# Patient Record
Sex: FEMALE | Race: WHITE | Employment: UNEMPLOYED | URBAN - NONMETROPOLITAN AREA
[De-identification: names, ages, dates, MRNs, and addresses within clinical notes are randomized per-mention and may not be internally consistent; named-entity substitution may affect disease eponyms.]

---

## 2023-06-15 PROBLEM — E66.811 CLASS 1 OBESITY DUE TO EXCESS CALORIES WITH SERIOUS COMORBIDITY AND BODY MASS INDEX (BMI) OF 34.0 TO 34.9 IN ADULT: Status: ACTIVE | Noted: 2023-06-15

## 2024-09-02 ENCOUNTER — HOSPITAL ENCOUNTER (EMERGENCY)
Age: 49
Discharge: HOME | End: 2024-09-02
Attending: STUDENT IN AN ORGANIZED HEALTH CARE EDUCATION/TRAINING PROGRAM
Payer: COMMERCIAL

## 2024-09-02 VITALS
HEART RATE: 79 BPM | RESPIRATION RATE: 18 BRPM | WEIGHT: 170 LBS | SYSTOLIC BLOOD PRESSURE: 111 MMHG | DIASTOLIC BLOOD PRESSURE: 62 MMHG | TEMPERATURE: 98.1 F | OXYGEN SATURATION: 98 % | HEIGHT: 59 IN | BODY MASS INDEX: 34.27 KG/M2

## 2024-09-02 DIAGNOSIS — T63.441A BEE STING REACTION, ACCIDENTAL OR UNINTENTIONAL, INITIAL ENCOUNTER: Primary | ICD-10-CM

## 2024-09-02 DIAGNOSIS — R22.0 FACIAL SWELLING: ICD-10-CM

## 2024-09-02 PROCEDURE — 99283 EMERGENCY DEPT VISIT LOW MDM: CPT

## 2024-09-02 PROCEDURE — 6360000002 HC RX W HCPCS: Performed by: STUDENT IN AN ORGANIZED HEALTH CARE EDUCATION/TRAINING PROGRAM

## 2024-09-02 RX ADMIN — Medication 10 MG: at 09:07

## 2024-09-02 ASSESSMENT — PAIN - FUNCTIONAL ASSESSMENT: PAIN_FUNCTIONAL_ASSESSMENT: 0-10

## 2024-09-02 ASSESSMENT — LIFESTYLE VARIABLES
HOW MANY STANDARD DRINKS CONTAINING ALCOHOL DO YOU HAVE ON A TYPICAL DAY: PATIENT DOES NOT DRINK
HOW OFTEN DO YOU HAVE A DRINK CONTAINING ALCOHOL: NEVER

## 2024-09-02 ASSESSMENT — PAIN SCALES - GENERAL: PAINLEVEL_OUTOF10: 5

## 2024-09-02 ASSESSMENT — PAIN DESCRIPTION - DESCRIPTORS: DESCRIPTORS: ACHING

## 2024-09-02 ASSESSMENT — PAIN DESCRIPTION - ORIENTATION: ORIENTATION: LEFT

## 2024-09-02 ASSESSMENT — PAIN DESCRIPTION - LOCATION: LOCATION: EYE

## 2024-09-02 ASSESSMENT — PAIN DESCRIPTION - PAIN TYPE: TYPE: ACUTE PAIN

## 2024-09-02 NOTE — ED PROVIDER NOTES
Emergency Department Encounter    Patient: Shayy Newsome  MRN: [id number]  : 1975  Date of Evaluation: 2024  ED Provider:  Marvin Hoff DO    Triage Chief Complaint:   Insect Bite (To left eye by a bee yesterday. Eye is swollen )    [place]:  Shayy Newsome is a 48 y.o. female that presents with 1 day of swelling to her left face in the periorbital region.  Reports that yesterday she was stung by bee on her cheek.  Had some swelling throughout the day yesterday but this morning it was worse and involved her eyelid.  Denies any pain in the actual eye just the surrounding structures.  When her eyes opened up she has grossly intact vision.  No known anaphylactic reactions.  Never has been stung by bee before.  Denies any swelling in her mouth throat, no difficulty breathing wheezing or swallowing.    MDM:    History from : Patient    On arrival patient well-appearing normal vital signs no acute distress.  She has isolated swelling involving the left upper cheek and periorbital region.  There is no significant erythema concerning for secondary infection.  Based on her symptoms and exam lower sufficient for anaphylactic reaction.  Likely local inflammatory reaction from the sting itself.  No stinger that I can identify that can be removed.  She has been trialing Benadryl and ice with some improvement at home.  Recommended continue doing these therapies.  Additionally will trial steroids today.  I did note that she has had adverse reactions of prednisone and tells me that it has caused behavioral changes in her.  Will trial one-time dose of dexamethasone here and have her monitor at home.  I do not see any signs of preseptal or orbital cellulitis at this time.  I do not think antibiotics are indicated.  I did discuss with her signs and to look out for developing secondary infection.  Exam of her actual eye is reassuring she has equal pupils no chemosis or injection and no evidence of extraocular motion

## 2024-09-02 NOTE — DISCHARGE INSTR - COC
Continuity of Care Form    Patient Name: Shayy Newsome   :  1975  MRN:  [id number]    Admit date:  2024  Discharge date:  ***    Code Status Order: Prior   Advance Directives:   Advance Care Flowsheet Documentation             Admitting Physician:  No admitting provider for patient encounter.  PCP: Bisi Hernández MD    Discharging Nurse: ***  Discharging Hospital Unit/Room#:   Discharging Unit Phone Number: ***    Emergency Contact:   Extended Emergency Contact Information  Primary Emergency Contact: ariana tidwell  Address: [address] [place] [address]  Home Phone: [phone]  Relation: Domestic Partner    Past Surgical History:  Past Surgical History:   Procedure Laterality Date    APPENDECTOMY      CARPAL TUNNEL RELEASE Bilateral 2021    CHOLECYSTECTOMY      HYSTERECTOMY (CERVIX STATUS UNKNOWN)      IR BIOPSY LIVER PERCUTANEOUS  4/3/2023    IR BIOPSY LIVER PERCUTANEOUS 4/3/2023 Doug Malone DO [facility]    KNEE SURGERY Right     NECK SURGERY      Tumor removed from neck    PELVIC LAPAROSCOPY      TOE SURGERY      TUBAL LIGATION         Immunization History:   Immunization History   Administered Date(s) Administered    COVID-19, PFIZER Bivalent, DO NOT Dilute, (age 12y+), IM, 30 mcg/0.3 mL 2022    COVID-19, PFIZER PURPLE top, DILUTE for use, (age 12 y+), 30mcg/0.3mL 2021, 2021, 2022    COVID-19, PFIZER, (- formula), (age 12y+), IM, 30mcg/0.3mL 2023    Influenza, FLUCELVAX, (age 6 mo+), MDCK, Quadv PF, 0.5mL 2021, 2022    TDaP, ADACEL (age 10y-64y), BOOSTRIX (age 10y+), IM, 0.5mL 2022       Active Problems:  Patient Active Problem List   Diagnosis Code    DVT of deep femoral vein, left (HCC) I82.412    DVT, lower extremity, distal, acute, bilateral (HCC) I82.4Z3    H/O deep venous thrombosis Z86.718    Dyslipidemia E78.5    Class 1 obesity due to excess calories with serious

## 2024-09-03 ENCOUNTER — TELEPHONE (OUTPATIENT)
Dept: CARDIOLOGY CLINIC | Age: 49
End: 2024-09-03

## 2024-09-03 NOTE — TELEPHONE ENCOUNTER
Patient called she has a knot on her left leg above her   Ankle that is purple / warm to the touch , she has a hx  Of a blood clot in that leg please advise,

## 2024-09-04 ENCOUNTER — APPOINTMENT (OUTPATIENT)
Dept: ULTRASOUND IMAGING | Age: 49
End: 2024-09-04
Payer: COMMERCIAL

## 2024-09-04 ENCOUNTER — HOSPITAL ENCOUNTER (EMERGENCY)
Age: 49
Discharge: HOME | End: 2024-09-04
Attending: EMERGENCY MEDICINE
Payer: COMMERCIAL

## 2024-09-04 VITALS
HEART RATE: 80 BPM | RESPIRATION RATE: 16 BRPM | DIASTOLIC BLOOD PRESSURE: 78 MMHG | TEMPERATURE: 97.8 F | HEIGHT: 59 IN | WEIGHT: 177.6 LBS | SYSTOLIC BLOOD PRESSURE: 120 MMHG | OXYGEN SATURATION: 100 % | BODY MASS INDEX: 35.8 KG/M2

## 2024-09-04 DIAGNOSIS — M25.552 PAIN IN JOINT INVOLVING LEFT PELVIC REGION AND THIGH: Primary | ICD-10-CM

## 2024-09-04 PROCEDURE — 99284 EMERGENCY DEPT VISIT MOD MDM: CPT

## 2024-09-04 PROCEDURE — 93971 EXTREMITY STUDY: CPT

## 2024-09-04 ASSESSMENT — PAIN - FUNCTIONAL ASSESSMENT: PAIN_FUNCTIONAL_ASSESSMENT: NONE - DENIES PAIN

## 2024-09-04 NOTE — TELEPHONE ENCOUNTER
Shayy called the office back, states her leg is swollen,red, and warm.  She is wanting to go to the ED to get checked out since she has a history of blood clots.

## 2024-09-04 NOTE — ED PROVIDER NOTES
Triage Chief Complaint:   Leg Swelling (Has a spot to Lt lower leg that is discolored and she thought it was a bruise.Started about a month ago. Some swelling in that area and felt like it was warm to the touch. Does have history of blood clot in the past.)    [place]:  Shayy Newsome is a 48 y.o. female that presents to the ED after being evaluated by nurse.  Patient states she has had a DVT before and reviewed the records + October 2022.  This occurred post COVID.  Patient to ED 40 hours ago for bee sting to her face no current complications.  Patient has no posterior calf pain.  She states family member is a nurse also in another state and recommended that she come in to be seen.  No other high risk features no red flags no injury no puncture wounds        Past Medical History:   Diagnosis Date    Anxiety     Bipolar 1 disorder (HCC)     Depression     MARITZA (generalized anxiety disorder)     GERD (gastroesophageal reflux disease)     Hypercholesteremia     Hyperinsulinemia     Hyperlipidemia     Migraine     Psychogenic nonepileptic seizure     PTSD (post-traumatic stress disorder)     RLS (restless legs syndrome)     Vitamin D deficiency      Past Surgical History:   Procedure Laterality Date    APPENDECTOMY      CARPAL TUNNEL RELEASE Bilateral 09/2021    CHOLECYSTECTOMY      HYSTERECTOMY (CERVIX STATUS UNKNOWN)      IR BIOPSY LIVER PERCUTANEOUS  4/3/2023    IR BIOPSY LIVER PERCUTANEOUS 4/3/2023 Doug Malone DO [facility]    KNEE SURGERY Right     NECK SURGERY      Tumor removed from neck    PELVIC LAPAROSCOPY      TOE SURGERY      TUBAL LIGATION       Family History   Problem Relation Age of Onset    Diabetes Mother     Cancer Father         lung    High Blood Pressure Father     High Cholesterol Father     Hypertension Father     Heart Attack Father     Heart Disease Father     Stroke Father      Social History     Socioeconomic History    Marital status:      Spouse name: Not on file

## 2025-05-06 ENCOUNTER — APPOINTMENT (OUTPATIENT)
Dept: CT IMAGING | Age: 50
End: 2025-05-06
Payer: COMMERCIAL

## 2025-05-06 ENCOUNTER — HOSPITAL ENCOUNTER (OUTPATIENT)
Age: 50
Setting detail: OBSERVATION
Discharge: HOME | End: 2025-05-07
Attending: STUDENT IN AN ORGANIZED HEALTH CARE EDUCATION/TRAINING PROGRAM | Admitting: INTERNAL MEDICINE
Payer: COMMERCIAL

## 2025-05-06 ENCOUNTER — HOSPITAL ENCOUNTER (EMERGENCY)
Age: 50
Discharge: ANOTHER HOSPITAL | End: 2025-05-06
Attending: EMERGENCY MEDICINE
Payer: COMMERCIAL

## 2025-05-06 ENCOUNTER — APPOINTMENT (OUTPATIENT)
Dept: GENERAL RADIOLOGY | Age: 50
End: 2025-05-06
Payer: COMMERCIAL

## 2025-05-06 VITALS
WEIGHT: 193.4 LBS | RESPIRATION RATE: 23 BRPM | TEMPERATURE: 98.4 F | OXYGEN SATURATION: 99 % | BODY MASS INDEX: 38.99 KG/M2 | HEART RATE: 70 BPM | SYSTOLIC BLOOD PRESSURE: 126 MMHG | HEIGHT: 59 IN | DIASTOLIC BLOOD PRESSURE: 72 MMHG

## 2025-05-06 DIAGNOSIS — R20.2 PARESTHESIAS: Primary | ICD-10-CM

## 2025-05-06 DIAGNOSIS — R29.90 STROKE-LIKE SYMPTOMS: Primary | ICD-10-CM

## 2025-05-06 LAB
ALBUMIN SERPL-MCNC: 4.1 G/DL (ref 3.4–5)
ALBUMIN/GLOB SERPL: 1.4 {RATIO}
ALP SERPL-CCNC: 62 U/L (ref 40–129)
ALT SERPL-CCNC: 53 U/L (ref 10–40)
ANION GAP SERPL CALCULATED.3IONS-SCNC: 12 MMOL/L (ref 9–17)
AST SERPL-CCNC: 36 U/L (ref 15–37)
BASOPHILS # BLD: 0.01 K/UL
BASOPHILS NFR BLD: 0 % (ref 0–1)
BILIRUB SERPL-MCNC: 0.2 MG/DL (ref 0–1)
BUN SERPL-MCNC: 8 MG/DL (ref 7–20)
CALCIUM SERPL-MCNC: 9 MG/DL (ref 8.3–10.6)
CHLORIDE SERPL-SCNC: 103 MMOL/L (ref 99–110)
CO2 SERPL-SCNC: 24 MMOL/L (ref 21–32)
CREAT SERPL-MCNC: 0.6 MG/DL (ref 0.6–1.1)
EOSINOPHIL # BLD: 0.01 K/UL
EOSINOPHILS RELATIVE PERCENT: 0 % (ref 0–3)
ERYTHROCYTE [DISTWIDTH] IN BLOOD BY AUTOMATED COUNT: 13.4 % (ref 11.7–14.9)
GFR, ESTIMATED: >90 ML/MIN/1.73M2
GLUCOSE BLD-MCNC: 105 MG/DL (ref 74–99)
GLUCOSE SERPL-MCNC: 92 MG/DL (ref 74–99)
HCT VFR BLD AUTO: 39.1 % (ref 37–47)
HGB BLD-MCNC: 13.2 G/DL (ref 12.5–16)
IMM GRANULOCYTES # BLD AUTO: 0.01 K/UL
IMM GRANULOCYTES NFR BLD: 0 %
LYMPHOCYTES NFR BLD: 1.83 K/UL
LYMPHOCYTES RELATIVE PERCENT: 36 % (ref 24–44)
MCH RBC QN AUTO: 27.6 PG (ref 27–31)
MCHC RBC AUTO-ENTMCNC: 33.8 G/DL (ref 32–36)
MCV RBC AUTO: 81.6 FL (ref 78–100)
MONOCYTES NFR BLD: 0.37 K/UL
MONOCYTES NFR BLD: 7 % (ref 0–5)
NEUTROPHILS NFR BLD: 57 % (ref 36–66)
NEUTS SEG NFR BLD: 2.9 K/UL
PLATELET # BLD AUTO: 275 K/UL (ref 140–440)
PMV BLD AUTO: 9.4 FL (ref 7.5–11.1)
POTASSIUM SERPL-SCNC: 3.8 MMOL/L (ref 3.5–5.1)
PROT SERPL-MCNC: 7.1 G/DL (ref 6.4–8.2)
RBC # BLD AUTO: 4.79 M/UL (ref 4.2–5.4)
SODIUM SERPL-SCNC: 139 MMOL/L (ref 136–145)
WBC OTHER # BLD: 5.1 K/UL (ref 4–10.5)

## 2025-05-06 PROCEDURE — 6360000002 HC RX W HCPCS: Performed by: INTERNAL MEDICINE

## 2025-05-06 PROCEDURE — 93005 ELECTROCARDIOGRAM TRACING: CPT | Performed by: EMERGENCY MEDICINE

## 2025-05-06 PROCEDURE — 85025 COMPLETE CBC W/AUTO DIFF WBC: CPT

## 2025-05-06 PROCEDURE — 2500000003 HC RX 250 WO HCPCS: Performed by: INTERNAL MEDICINE

## 2025-05-06 PROCEDURE — 82962 GLUCOSE BLOOD TEST: CPT

## 2025-05-06 PROCEDURE — 80053 COMPREHEN METABOLIC PANEL: CPT

## 2025-05-06 PROCEDURE — 6370000000 HC RX 637 (ALT 250 FOR IP): Performed by: INTERNAL MEDICINE

## 2025-05-06 PROCEDURE — 6360000004 HC RX CONTRAST MEDICATION: Performed by: EMERGENCY MEDICINE

## 2025-05-06 PROCEDURE — G0379 DIRECT REFER HOSPITAL OBSERV: HCPCS

## 2025-05-06 PROCEDURE — 6370000000 HC RX 637 (ALT 250 FOR IP): Performed by: EMERGENCY MEDICINE

## 2025-05-06 PROCEDURE — 71045 X-RAY EXAM CHEST 1 VIEW: CPT

## 2025-05-06 PROCEDURE — 70450 CT HEAD/BRAIN W/O DYE: CPT

## 2025-05-06 PROCEDURE — 94761 N-INVAS EAR/PLS OXIMETRY MLT: CPT

## 2025-05-06 PROCEDURE — G0378 HOSPITAL OBSERVATION PER HR: HCPCS

## 2025-05-06 PROCEDURE — 70496 CT ANGIOGRAPHY HEAD: CPT

## 2025-05-06 PROCEDURE — 99285 EMERGENCY DEPT VISIT HI MDM: CPT

## 2025-05-06 PROCEDURE — 96372 THER/PROPH/DIAG INJ SC/IM: CPT

## 2025-05-06 RX ORDER — SODIUM CHLORIDE 0.9 % (FLUSH) 0.9 %
5-40 SYRINGE (ML) INJECTION EVERY 12 HOURS SCHEDULED
Status: DISCONTINUED | OUTPATIENT
Start: 2025-05-06 | End: 2025-05-07 | Stop reason: HOSPADM

## 2025-05-06 RX ORDER — BUSPIRONE HYDROCHLORIDE 7.5 MG/1
7.5 TABLET ORAL 2 TIMES DAILY
COMMUNITY
Start: 2025-04-07

## 2025-05-06 RX ORDER — SODIUM CHLORIDE 9 MG/ML
INJECTION, SOLUTION INTRAVENOUS PRN
Status: DISCONTINUED | OUTPATIENT
Start: 2025-05-06 | End: 2025-05-07 | Stop reason: HOSPADM

## 2025-05-06 RX ORDER — ESOMEPRAZOLE MAGNESIUM 40 MG/1
40 GRANULE, DELAYED RELEASE ORAL
Status: DISCONTINUED | OUTPATIENT
Start: 2025-05-07 | End: 2025-05-06 | Stop reason: SDUPTHER

## 2025-05-06 RX ORDER — HYDROXYZINE PAMOATE 25 MG/1
50 CAPSULE ORAL NIGHTLY
Status: DISCONTINUED | OUTPATIENT
Start: 2025-05-06 | End: 2025-05-07 | Stop reason: HOSPADM

## 2025-05-06 RX ORDER — LAMOTRIGINE 100 MG/1
200 TABLET ORAL NIGHTLY
Status: DISCONTINUED | OUTPATIENT
Start: 2025-05-06 | End: 2025-05-07 | Stop reason: HOSPADM

## 2025-05-06 RX ORDER — ESOMEPRAZOLE MAGNESIUM 40 MG/1
40 GRANULE, DELAYED RELEASE ORAL DAILY
COMMUNITY

## 2025-05-06 RX ORDER — DOXEPIN HYDROCHLORIDE 10 MG/1
20 CAPSULE ORAL NIGHTLY
Status: DISCONTINUED | OUTPATIENT
Start: 2025-05-06 | End: 2025-05-07 | Stop reason: HOSPADM

## 2025-05-06 RX ORDER — FENOFIBRATE 54 MG/1
54 TABLET ORAL DAILY
Status: DISCONTINUED | OUTPATIENT
Start: 2025-05-07 | End: 2025-05-07 | Stop reason: HOSPADM

## 2025-05-06 RX ORDER — DOXEPIN HYDROCHLORIDE 10 MG/1
20 CAPSULE ORAL NIGHTLY
COMMUNITY
Start: 2025-03-03

## 2025-05-06 RX ORDER — BUSPIRONE HYDROCHLORIDE 15 MG/1
7.5 TABLET ORAL 2 TIMES DAILY
Status: DISCONTINUED | OUTPATIENT
Start: 2025-05-06 | End: 2025-05-07 | Stop reason: HOSPADM

## 2025-05-06 RX ORDER — GABAPENTIN 300 MG/1
600 CAPSULE ORAL 3 TIMES DAILY
Status: DISCONTINUED | OUTPATIENT
Start: 2025-05-06 | End: 2025-05-07 | Stop reason: HOSPADM

## 2025-05-06 RX ORDER — ASPIRIN 325 MG
325 TABLET ORAL ONCE
Status: COMPLETED | OUTPATIENT
Start: 2025-05-06 | End: 2025-05-06

## 2025-05-06 RX ORDER — IOPAMIDOL 755 MG/ML
85 INJECTION, SOLUTION INTRAVASCULAR
Status: COMPLETED | OUTPATIENT
Start: 2025-05-06 | End: 2025-05-06

## 2025-05-06 RX ORDER — POLYETHYLENE GLYCOL 3350 17 G/17G
17 POWDER, FOR SOLUTION ORAL DAILY PRN
Status: DISCONTINUED | OUTPATIENT
Start: 2025-05-06 | End: 2025-05-07 | Stop reason: HOSPADM

## 2025-05-06 RX ORDER — PRAMIPEXOLE DIHYDROCHLORIDE 1 MG/1
2 TABLET ORAL NIGHTLY
Status: DISCONTINUED | OUTPATIENT
Start: 2025-05-06 | End: 2025-05-07 | Stop reason: HOSPADM

## 2025-05-06 RX ORDER — ASPIRIN 300 MG/1
300 SUPPOSITORY RECTAL DAILY
Status: DISCONTINUED | OUTPATIENT
Start: 2025-05-06 | End: 2025-05-07 | Stop reason: HOSPADM

## 2025-05-06 RX ORDER — ONDANSETRON 2 MG/ML
4 INJECTION INTRAMUSCULAR; INTRAVENOUS EVERY 6 HOURS PRN
Status: DISCONTINUED | OUTPATIENT
Start: 2025-05-06 | End: 2025-05-07 | Stop reason: HOSPADM

## 2025-05-06 RX ORDER — ASPIRIN 81 MG/1
81 TABLET, CHEWABLE ORAL DAILY
Status: DISCONTINUED | OUTPATIENT
Start: 2025-05-06 | End: 2025-05-07 | Stop reason: HOSPADM

## 2025-05-06 RX ORDER — ROSUVASTATIN CALCIUM 5 MG/1
10 TABLET, COATED ORAL NIGHTLY
Status: DISCONTINUED | OUTPATIENT
Start: 2025-05-06 | End: 2025-05-07 | Stop reason: HOSPADM

## 2025-05-06 RX ORDER — ONDANSETRON 4 MG/1
4 TABLET, ORALLY DISINTEGRATING ORAL EVERY 8 HOURS PRN
Status: DISCONTINUED | OUTPATIENT
Start: 2025-05-06 | End: 2025-05-07 | Stop reason: HOSPADM

## 2025-05-06 RX ORDER — PANTOPRAZOLE SODIUM 40 MG/1
40 TABLET, DELAYED RELEASE ORAL NIGHTLY
Status: DISCONTINUED | OUTPATIENT
Start: 2025-05-06 | End: 2025-05-07 | Stop reason: HOSPADM

## 2025-05-06 RX ORDER — LAMOTRIGINE 200 MG/1
200 TABLET ORAL DAILY
COMMUNITY
Start: 2025-03-06

## 2025-05-06 RX ORDER — ENOXAPARIN SODIUM 100 MG/ML
40 INJECTION SUBCUTANEOUS DAILY
Status: DISCONTINUED | OUTPATIENT
Start: 2025-05-06 | End: 2025-05-07 | Stop reason: HOSPADM

## 2025-05-06 RX ORDER — SODIUM CHLORIDE 0.9 % (FLUSH) 0.9 %
5-40 SYRINGE (ML) INJECTION PRN
Status: DISCONTINUED | OUTPATIENT
Start: 2025-05-06 | End: 2025-05-07 | Stop reason: HOSPADM

## 2025-05-06 RX ORDER — HYDROXYZINE PAMOATE 25 MG/1
25 CAPSULE ORAL DAILY
Status: DISCONTINUED | OUTPATIENT
Start: 2025-05-07 | End: 2025-05-07 | Stop reason: HOSPADM

## 2025-05-06 RX ADMIN — PANTOPRAZOLE SODIUM 40 MG: 40 TABLET, DELAYED RELEASE ORAL at 21:56

## 2025-05-06 RX ADMIN — HYDROXYZINE PAMOATE 50 MG: 25 CAPSULE ORAL at 21:56

## 2025-05-06 RX ADMIN — ROSUVASTATIN CALCIUM 10 MG: 5 TABLET, FILM COATED ORAL at 21:55

## 2025-05-06 RX ADMIN — ASPIRIN 325 MG: 325 TABLET ORAL at 14:36

## 2025-05-06 RX ADMIN — ASPIRIN 81 MG: 81 TABLET, CHEWABLE ORAL at 21:55

## 2025-05-06 RX ADMIN — SODIUM CHLORIDE, PRESERVATIVE FREE 10 ML: 5 INJECTION INTRAVENOUS at 22:00

## 2025-05-06 RX ADMIN — IOPAMIDOL 85 ML: 755 INJECTION, SOLUTION INTRAVENOUS at 14:15

## 2025-05-06 RX ADMIN — PRAMIPEXOLE DIHYDROCHLORIDE 2 MG: 1 TABLET ORAL at 21:56

## 2025-05-06 RX ADMIN — BUSPIRONE HYDROCHLORIDE 7.5 MG: 15 TABLET ORAL at 21:56

## 2025-05-06 RX ADMIN — GABAPENTIN 600 MG: 300 CAPSULE ORAL at 21:55

## 2025-05-06 RX ADMIN — DOXEPIN HYDROCHLORIDE 20 MG: 10 CAPSULE ORAL at 21:56

## 2025-05-06 RX ADMIN — ENOXAPARIN SODIUM 40 MG: 100 INJECTION SUBCUTANEOUS at 22:00

## 2025-05-06 RX ADMIN — LAMOTRIGINE 200 MG: 100 TABLET ORAL at 21:56

## 2025-05-06 ASSESSMENT — PAIN DESCRIPTION - FREQUENCY: FREQUENCY: CONTINUOUS

## 2025-05-06 ASSESSMENT — PAIN SCALES - GENERAL
PAINLEVEL_OUTOF10: 8
PAINLEVEL_OUTOF10: 0

## 2025-05-06 ASSESSMENT — PAIN - FUNCTIONAL ASSESSMENT
PAIN_FUNCTIONAL_ASSESSMENT: 0-10
PAIN_FUNCTIONAL_ASSESSMENT: PREVENTS OR INTERFERES SOME ACTIVE ACTIVITIES AND ADLS
PAIN_FUNCTIONAL_ASSESSMENT: PREVENTS OR INTERFERES SOME ACTIVE ACTIVITIES AND ADLS

## 2025-05-06 ASSESSMENT — PAIN DESCRIPTION - ONSET: ONSET: SUDDEN

## 2025-05-06 ASSESSMENT — PAIN DESCRIPTION - PAIN TYPE
TYPE: ACUTE PAIN
TYPE: ACUTE PAIN

## 2025-05-06 ASSESSMENT — PAIN DESCRIPTION - DESCRIPTORS: DESCRIPTORS: NUMBNESS

## 2025-05-06 ASSESSMENT — PAIN DESCRIPTION - LOCATION: LOCATION: ARM

## 2025-05-06 ASSESSMENT — PAIN DESCRIPTION - ORIENTATION: ORIENTATION: LEFT

## 2025-05-06 NOTE — ED NOTES
Phone call from [facility] that pt has been accepted to [facility] who reviewed the chart and does not require speaking with Dr Schulte. Dr Schulte updated.

## 2025-05-06 NOTE — ED PROVIDER NOTES
Triage Chief Complaint:   Dizziness, Numbness, and Blurred Vision (About 0900 started with dizziness/lightheadedness. C/O Lt arm numbness and Lt eye blurriness. Was concerned when her B/P was much higher than normal.)    [place]:  Shayy Newsome is a 49 y.o. female that presents to the ED after friend dropped her off.  Patient does have a history of migraine headaches bipolar denies of any active headache 9:00 today she just felt a little bit funny felt tingling in the left upper extremity she took her blood pressure was high at 146/91 for her.  She is not being treated for high blood pressure patient denies smoking but she uses marijuana frequently last time last evening.  She feels lightheaded and dizzy she has some blurry vision in the left side without any scotomas or diplopia.  No problems with speech    Past medical history as noted for psychogenic nonepileptic seizures        Past Medical History:   Diagnosis Date    Anxiety     Bipolar 1 disorder (HCC)     Depression     MARITZA (generalized anxiety disorder)     GERD (gastroesophageal reflux disease)     Hypercholesteremia     Hyperinsulinemia     Hyperlipidemia     Migraine     Psychogenic nonepileptic seizure     PTSD (post-traumatic stress disorder)     RLS (restless legs syndrome)     Vitamin D deficiency      Past Surgical History:   Procedure Laterality Date    APPENDECTOMY      CARPAL TUNNEL RELEASE Bilateral 09/2021    CHOLECYSTECTOMY      HYSTERECTOMY (CERVIX STATUS UNKNOWN)      IR BIOPSY LIVER PERCUTANEOUS  4/3/2023    IR BIOPSY LIVER PERCUTANEOUS 4/3/2023 Doug Malone DO [facility]    KNEE SURGERY Right     NECK SURGERY      Tumor removed from neck    PELVIC LAPAROSCOPY      TOE SURGERY      TUBAL LIGATION       Family History   Problem Relation Age of Onset    Diabetes Mother     Cancer Father         lung    High Blood Pressure Father     High Cholesterol Father     Hypertension Father     Heart Attack Father     Heart Disease

## 2025-05-07 ENCOUNTER — APPOINTMENT (OUTPATIENT)
Dept: NON INVASIVE DIAGNOSTICS | Age: 50
End: 2025-05-07
Attending: PSYCHIATRY & NEUROLOGY
Payer: COMMERCIAL

## 2025-05-07 ENCOUNTER — APPOINTMENT (OUTPATIENT)
Dept: MRI IMAGING | Age: 50
End: 2025-05-07
Attending: STUDENT IN AN ORGANIZED HEALTH CARE EDUCATION/TRAINING PROGRAM
Payer: COMMERCIAL

## 2025-05-07 VITALS
OXYGEN SATURATION: 98 % | HEART RATE: 75 BPM | RESPIRATION RATE: 17 BRPM | BODY MASS INDEX: 38.71 KG/M2 | WEIGHT: 192 LBS | DIASTOLIC BLOOD PRESSURE: 74 MMHG | HEIGHT: 59 IN | SYSTOLIC BLOOD PRESSURE: 124 MMHG | TEMPERATURE: 98.1 F

## 2025-05-07 PROBLEM — R29.90 STROKE-LIKE SYMPTOMS: Status: ACTIVE | Noted: 2025-05-07

## 2025-05-07 LAB
ANION GAP SERPL CALCULATED.3IONS-SCNC: 11 MMOL/L (ref 9–17)
BUN SERPL-MCNC: 10 MG/DL (ref 7–20)
CALCIUM SERPL-MCNC: 8.9 MG/DL (ref 8.3–10.6)
CHLORIDE SERPL-SCNC: 103 MMOL/L (ref 99–110)
CHOLEST SERPL-MCNC: 153 MG/DL (ref 125–199)
CO2 SERPL-SCNC: 25 MMOL/L (ref 21–32)
CREAT SERPL-MCNC: 0.8 MG/DL (ref 0.6–1.1)
ECHO AO ROOT DIAM: 3.1 CM
ECHO AO ROOT INDEX: 1.71 CM/M2
ECHO AV AREA PEAK VELOCITY: 2.6 CM2
ECHO AV AREA VTI: 2.5 CM2
ECHO AV AREA/BSA PEAK VELOCITY: 1.4 CM2/M2
ECHO AV AREA/BSA VTI: 1.4 CM2/M2
ECHO AV MEAN GRADIENT: 3 MMHG
ECHO AV MEAN VELOCITY: 0.8 M/S
ECHO AV PEAK GRADIENT: 5 MMHG
ECHO AV PEAK VELOCITY: 1.1 M/S
ECHO AV VELOCITY RATIO: 0.82
ECHO AV VTI: 23.7 CM
ECHO BSA: 1.9 M2
ECHO EST RA PRESSURE: 3 MMHG
ECHO LA AREA 4C: 11.5 CM2
ECHO LA DIAMETER INDEX: 1.55 CM/M2
ECHO LA DIAMETER: 2.8 CM
ECHO LA MAJOR AXIS: 4.4 CM
ECHO LA TO AORTIC ROOT RATIO: 0.9
ECHO LA VOL MOD A4C: 23 ML (ref 22–52)
ECHO LA VOLUME INDEX MOD A4C: 13 ML/M2 (ref 16–34)
ECHO LV E' LATERAL VELOCITY: 15.9 CM/S
ECHO LV E' SEPTAL VELOCITY: 8.2 CM/S
ECHO LV EDV A4C: 33 ML
ECHO LV EDV INDEX A4C: 18 ML/M2
ECHO LV EF PHYSICIAN: 55 %
ECHO LV EJECTION FRACTION A4C: 45 %
ECHO LV ESV A4C: 18 ML
ECHO LV ESV INDEX A4C: 10 ML/M2
ECHO LV FRACTIONAL SHORTENING: 51 % (ref 28–44)
ECHO LV INTERNAL DIMENSION DIASTOLE INDEX: 2.15 CM/M2
ECHO LV INTERNAL DIMENSION DIASTOLIC: 3.9 CM (ref 3.9–5.3)
ECHO LV INTERNAL DIMENSION SYSTOLIC INDEX: 1.05 CM/M2
ECHO LV INTERNAL DIMENSION SYSTOLIC: 1.9 CM
ECHO LV IVSD: 1.1 CM (ref 0.6–0.9)
ECHO LV MASS 2D: 131 G (ref 67–162)
ECHO LV MASS INDEX 2D: 72.4 G/M2 (ref 43–95)
ECHO LV POSTERIOR WALL DIASTOLIC: 1 CM (ref 0.6–0.9)
ECHO LV RELATIVE WALL THICKNESS RATIO: 0.51
ECHO LVOT AREA: 3.1 CM2
ECHO LVOT AV VTI INDEX: 0.78
ECHO LVOT DIAM: 2 CM
ECHO LVOT MEAN GRADIENT: 2 MMHG
ECHO LVOT PEAK GRADIENT: 4 MMHG
ECHO LVOT PEAK VELOCITY: 0.9 M/S
ECHO LVOT STROKE VOLUME INDEX: 32.1 ML/M2
ECHO LVOT SV: 58.1 ML
ECHO LVOT VTI: 18.5 CM
ECHO MV A VELOCITY: 0.61 M/S
ECHO MV E DECELERATION TIME (DT): 215 MS
ECHO MV E VELOCITY: 0.83 M/S
ECHO MV E/A RATIO: 1.36
ECHO MV E/E' LATERAL: 5.22
ECHO MV E/E' RATIO (AVERAGED): 7.67
ECHO MV E/E' SEPTAL: 10.12
ECHO RIGHT VENTRICULAR SYSTOLIC PRESSURE (RVSP): 16 MMHG
ECHO RV MID DIMENSION: 3.5 CM
ECHO TV REGURGITANT MAX VELOCITY: 1.83 M/S
ECHO TV REGURGITANT PEAK GRADIENT: 13 MMHG
EKG ATRIAL RATE: 77 BPM
EKG DIAGNOSIS: NORMAL
EKG P AXIS: 41 DEGREES
EKG P-R INTERVAL: 160 MS
EKG Q-T INTERVAL: 418 MS
EKG QRS DURATION: 86 MS
EKG QTC CALCULATION (BAZETT): 473 MS
EKG R AXIS: 41 DEGREES
EKG T AXIS: 17 DEGREES
EKG VENTRICULAR RATE: 77 BPM
ERYTHROCYTE [DISTWIDTH] IN BLOOD BY AUTOMATED COUNT: 13.4 % (ref 11.7–14.9)
EST. AVERAGE GLUCOSE BLD GHB EST-MCNC: 132 MG/DL
GFR, ESTIMATED: 82 ML/MIN/1.73M2
GLUCOSE SERPL-MCNC: 107 MG/DL (ref 74–99)
HBA1C MFR BLD: 6.2 % (ref 4.2–6.3)
HCT VFR BLD AUTO: 37.5 % (ref 37–47)
HDLC SERPL-MCNC: 42 MG/DL
HGB BLD-MCNC: 12.5 G/DL (ref 12.5–16)
LDLC SERPL CALC-MCNC: 57 MG/DL
MCH RBC QN AUTO: 27.8 PG (ref 27–31)
MCHC RBC AUTO-ENTMCNC: 33.3 G/DL (ref 32–36)
MCV RBC AUTO: 83.3 FL (ref 78–100)
PLATELET # BLD AUTO: 256 K/UL (ref 140–440)
PMV BLD AUTO: 9.8 FL (ref 7.5–11.1)
POTASSIUM SERPL-SCNC: 3.8 MMOL/L (ref 3.5–5.1)
RBC # BLD AUTO: 4.5 M/UL (ref 4.2–5.4)
SODIUM SERPL-SCNC: 140 MMOL/L (ref 136–145)
TRIGL SERPL-MCNC: 273 MG/DL
WBC OTHER # BLD: 4.9 K/UL (ref 4–10.5)

## 2025-05-07 PROCEDURE — 96375 TX/PRO/DX INJ NEW DRUG ADDON: CPT

## 2025-05-07 PROCEDURE — 85027 COMPLETE CBC AUTOMATED: CPT

## 2025-05-07 PROCEDURE — 6360000002 HC RX W HCPCS: Performed by: INTERNAL MEDICINE

## 2025-05-07 PROCEDURE — 99223 1ST HOSP IP/OBS HIGH 75: CPT | Performed by: PSYCHIATRY & NEUROLOGY

## 2025-05-07 PROCEDURE — 6370000000 HC RX 637 (ALT 250 FOR IP): Performed by: INTERNAL MEDICINE

## 2025-05-07 PROCEDURE — G0378 HOSPITAL OBSERVATION PER HR: HCPCS

## 2025-05-07 PROCEDURE — 83036 HEMOGLOBIN GLYCOSYLATED A1C: CPT

## 2025-05-07 PROCEDURE — 2500000003 HC RX 250 WO HCPCS: Performed by: INTERNAL MEDICINE

## 2025-05-07 PROCEDURE — 80048 BASIC METABOLIC PNL TOTAL CA: CPT

## 2025-05-07 PROCEDURE — 6370000000 HC RX 637 (ALT 250 FOR IP)

## 2025-05-07 PROCEDURE — 97116 GAIT TRAINING THERAPY: CPT

## 2025-05-07 PROCEDURE — 93010 ELECTROCARDIOGRAM REPORT: CPT | Performed by: INTERNAL MEDICINE

## 2025-05-07 PROCEDURE — 97162 PT EVAL MOD COMPLEX 30 MIN: CPT

## 2025-05-07 PROCEDURE — 93306 TTE W/DOPPLER COMPLETE: CPT

## 2025-05-07 PROCEDURE — 36415 COLL VENOUS BLD VENIPUNCTURE: CPT

## 2025-05-07 PROCEDURE — 93306 TTE W/DOPPLER COMPLETE: CPT | Performed by: INTERNAL MEDICINE

## 2025-05-07 PROCEDURE — 6360000002 HC RX W HCPCS: Performed by: NURSE PRACTITIONER

## 2025-05-07 PROCEDURE — 70551 MRI BRAIN STEM W/O DYE: CPT

## 2025-05-07 PROCEDURE — 96372 THER/PROPH/DIAG INJ SC/IM: CPT

## 2025-05-07 PROCEDURE — 96374 THER/PROPH/DIAG INJ IV PUSH: CPT

## 2025-05-07 PROCEDURE — 80061 LIPID PANEL: CPT

## 2025-05-07 RX ORDER — PROCHLORPERAZINE EDISYLATE 5 MG/ML
10 INJECTION INTRAMUSCULAR; INTRAVENOUS ONCE
Status: COMPLETED | OUTPATIENT
Start: 2025-05-07 | End: 2025-05-07

## 2025-05-07 RX ORDER — KETOROLAC TROMETHAMINE 15 MG/ML
15 INJECTION, SOLUTION INTRAMUSCULAR; INTRAVENOUS ONCE
Status: COMPLETED | OUTPATIENT
Start: 2025-05-07 | End: 2025-05-07

## 2025-05-07 RX ORDER — DIPHENHYDRAMINE HYDROCHLORIDE 50 MG/ML
25 INJECTION, SOLUTION INTRAMUSCULAR; INTRAVENOUS ONCE
Status: COMPLETED | OUTPATIENT
Start: 2025-05-07 | End: 2025-05-07

## 2025-05-07 RX ADMIN — SODIUM CHLORIDE, PRESERVATIVE FREE 10 ML: 5 INJECTION INTRAVENOUS at 08:42

## 2025-05-07 RX ADMIN — FENOFIBRATE 54 MG: 54 TABLET ORAL at 08:40

## 2025-05-07 RX ADMIN — ACETAMINOPHEN, ASPIRIN (NSAID) AND CAFFEINE 1 TABLET: 250; 250; 65 TABLET, FILM COATED ORAL at 03:00

## 2025-05-07 RX ADMIN — HYDROXYZINE PAMOATE 25 MG: 25 CAPSULE ORAL at 08:41

## 2025-05-07 RX ADMIN — KETOROLAC TROMETHAMINE 15 MG: 15 INJECTION, SOLUTION INTRAMUSCULAR; INTRAVENOUS at 08:41

## 2025-05-07 RX ADMIN — PROCHLORPERAZINE EDISYLATE 10 MG: 5 INJECTION INTRAMUSCULAR; INTRAVENOUS at 08:42

## 2025-05-07 RX ADMIN — GABAPENTIN 600 MG: 300 CAPSULE ORAL at 08:41

## 2025-05-07 RX ADMIN — ASPIRIN 81 MG: 81 TABLET, CHEWABLE ORAL at 08:40

## 2025-05-07 RX ADMIN — BUSPIRONE HYDROCHLORIDE 7.5 MG: 15 TABLET ORAL at 08:40

## 2025-05-07 RX ADMIN — AMOXICILLIN AND CLAVULANATE POTASSIUM 1 TABLET: 875; 125 TABLET, FILM COATED ORAL at 08:41

## 2025-05-07 RX ADMIN — ENOXAPARIN SODIUM 40 MG: 100 INJECTION SUBCUTANEOUS at 08:41

## 2025-05-07 RX ADMIN — DIPHENHYDRAMINE HYDROCHLORIDE 25 MG: 50 INJECTION, SOLUTION INTRAMUSCULAR; INTRAVENOUS at 08:42

## 2025-05-07 ASSESSMENT — PAIN DESCRIPTION - LOCATION
LOCATION: HEAD
LOCATION: HEAD

## 2025-05-07 ASSESSMENT — PAIN DESCRIPTION - PAIN TYPE
TYPE: ACUTE PAIN
TYPE: ACUTE PAIN

## 2025-05-07 ASSESSMENT — PAIN DESCRIPTION - ORIENTATION: ORIENTATION: LEFT

## 2025-05-07 ASSESSMENT — PAIN DESCRIPTION - DESCRIPTORS
DESCRIPTORS: ACHING
DESCRIPTORS: ACHING;THROBBING

## 2025-05-07 ASSESSMENT — PAIN SCALES - GENERAL: PAINLEVEL_OUTOF10: 5

## 2025-05-07 ASSESSMENT — PAIN DESCRIPTION - ONSET
ONSET: AWAKENED FROM SLEEP
ONSET: PROGRESSIVE

## 2025-05-07 ASSESSMENT — PAIN - FUNCTIONAL ASSESSMENT: PAIN_FUNCTIONAL_ASSESSMENT: PREVENTS OR INTERFERES SOME ACTIVE ACTIVITIES AND ADLS

## 2025-05-07 ASSESSMENT — PAIN DESCRIPTION - FREQUENCY: FREQUENCY: INTERMITTENT

## 2025-05-07 NOTE — H&P
History and Physical      Name:  Shayy Newsome /Age/Sex: 1975  (49 y.o. female)   MRN & CSN:  [id number] & [id number] Encounter Date/Time: 2025 8:11 PM EDT   Location:  [facility]  PCP: Claire Shelton APRN - CNP       Hospital Day: 1    Assessment and Plan:     #.  Strokelike symptoms  - CT head, CTA head and neck-no acute process  -NIHSS-0  - Continue NIHSS/PT/OT/SLP evaluation  - Continue aspirin, statin  - MRI brain ordered  - Neurology consult    #.  Acute sinusitis  - Complete opacification of the right sphenoid sinus on CT head  - Continue Augmentin    #.  Anxiety, depression, PTSD, insomnia  - BuSpar, doxepin, hydroxyzine, Caplyta, doxepin    #.  Hyperlipidemia-on Crestor, fenofibrate    #.  Chronic migraine  - Gabapentin, Aimovig    #.  GERD-on Nexium, Protonix    #. GREEN-on rezdiffra    #.  Seizure disorder-on Lamictal    #.  Restless leg syndrome-on pramipexole    #.  History of superficial right parotidectomy-2020    #.  History of left lower extremity DVT-10/2022  - Hypercoagulable workup was normal    #.  Obesity with BMI 38.83    Disposition:   Current Living situation: Home    Diet Diet NPO, regular diet after swallow evaluation   DVT Prophylaxis [x] Lovenox   Code Status Full Code   Surrogate Decision Maker/ POA      History from:   EMR, patient.    History of Present Illness:     Chief Complaint: <principal problem not specified>  Shayy Newsome is a 49 y.o. female presented to [place] ED with strokelike symptoms.  Patient reported not feeling well since last night.  Patient reported that she woke up in the middle of night, went to bathroom, did not feel well.  Patient took a nap again and when she woke up around 12:30 PM she still did not feel well.  Reported that her chest was hurting, blood pressure was high.  Reported numbness in the left upper and lower extremities, numbness on the left side of the face, right-sided headache, left eye blurriness.  Patient also reported  that she felt confused, denied any speech abnormality.  Never had similar symptoms in the past.  Denied any fever, chills, cough, denied any urinary complaints, no constipation or diarrhea.  Vitals on arrival-/23, HR 87, RR 17, temp 98.4, saturating 100% on room air.  Given CT head, CTA head and neck-no acute process.  CBC, CMP within normal range.  Stroke alert was called in ED.  [facility] stroke team did not recommend any intervention.  Patient received aspirin in ER.  Review of Systems: Need 10 Elements   10 point review of systems conducted and pertinent positives and negatives as per HPI.    Objective:   No intake or output data in the 24 hours ending 05/06/25 2011   Vitals:   Vitals:    05/06/25 1930   BP: 130/83   Pulse: 64   Resp: 17   Temp: 98.4 °F (36.9 °C)   TempSrc: Oral   SpO2: 98%   Weight: 87.2 kg (192 lb 3.9 oz)   Height: 1.499 m (4' 11\")       Medications Prior to Admission   Reviewed medications with patient    Prior to Admission medications    Medication Sig Start Date End Date Taking? Authorizing Provider   busPIRone (BUSPAR) 7.5 MG tablet Take 1 tablet by mouth 2 times daily 4/7/25  Yes ProviderJacob MD   doxepin (SINEQUAN) 10 MG capsule Take 2 capsules by mouth nightly 3/3/25  Yes ProviderJacob MD   lamoTRIgine (LAMICTAL) 200 MG tablet Take 1 tablet by mouth daily 3/6/25  Yes Jacob Gonsales MD   Resmetirom 80 MG TABS Take 80 mg by mouth daily 8/16/24  Yes ProviderJacob MD   esomeprazole Magnesium (NEXIUM) 40 MG PACK Take 1 packet by mouth daily   Yes Jacob Gonsales MD   hydrOXYzine pamoate (VISTARIL) 25 MG capsule TAKE 1 CAPSULES BY MOUTH 1-3 TIMES DAILY AS NEEDED FOR ANXIETY AND/OR 1-3 CAPSULES AS NEEDED FOR SLEEP. MAX DAILY AMOUNT 150 MG PER 24 HRS   Yes Jacob Gonsales MD   CAPLYTA 21 MG CAPS Take 1 capsule by mouth daily 5/7/24  Yes Jacob Gonsales MD   rizatriptan (MAXALT-MLT) 10 MG disintegrating tablet TAKE ONE TABLET BY MOUTH AT ONSET OF

## 2025-05-07 NOTE — CONSULTS
Neurology Service Consult Note  [facility]   Patient Name: Shayy Newsome  : 1975        Subjective:   Reason for consult: Stroke like symptoms  49 y.o.  female with history of anxiety, bipolar 1 disorder, depression, MARITZA, HLD, migraine, PNES, PTSD, RLS  presenting to [facility] with chest pain, HTN and left extremity and facial numbness. Also reported right sided headache and left eye blurriness. Patient is quite drowsy during time of exam as she has just received medication for headache. Significant other provides bulk of history. Patient has migraine type headaches at least four times monthly. Describes aching and throbbing pain on the right side of the head. She does experience associated photophobia, phonophobia and nausea. Patient tells me she still had sensory changes this morning (described as pins and needle sensation to the arm, leg and face) but this has stopped at time of my exam. She also reports headache is improved significantly rating it at 1 at time of exam. Reviewed MRI with the patient and her significant other they understand there is no evidence of stroke. Symptoms have persisted for greater than 24 hours therefore symptoms are not consistent with TIA. Patient does have appointment with neurologist upcoming this week for evaluation of headache.       Past Medical History:   Diagnosis Date    Anxiety     Bipolar 1 disorder (HCC)     Depression     MARITZA (generalized anxiety disorder)     GERD (gastroesophageal reflux disease)     Hypercholesteremia     Hyperinsulinemia     Hyperlipidemia     Migraine     Psychogenic nonepileptic seizure     PTSD (post-traumatic stress disorder)     RLS (restless legs syndrome)     Vitamin D deficiency     :   Past Surgical History:   Procedure Laterality Date    APPENDECTOMY      CARPAL TUNNEL RELEASE Bilateral 2021    CHOLECYSTECTOMY      HYSTERECTOMY (CERVIX STATUS UNKNOWN)      IR BIOPSY LIVER PERCUTANEOUS

## 2025-05-07 NOTE — DISCHARGE INSTR - DIET

## 2025-05-07 NOTE — PROGRESS NOTES
Physical Therapy  Facility/Department: [facility]  Physical Therapy Initial Assessment    Name: Shayy Newsome  : 1975  MRN: [id number]  Date of Service: 2025    Discharge Recommendations:  Outpatient PT, 24 hour supervision or assist        Functional Outcome Measure:    Acute Care Index of Function (ACIF):    Score: 0.94 (0.71 or greater  = appropriate for home with OP PT and 24 hour supervision/assist)        Patient Diagnosis(es): The encounter diagnosis was Stroke-like symptoms.  Past Medical History:  has a past medical history of Anxiety, Bipolar 1 disorder (HCC), Depression, MARITZA (generalized anxiety disorder), GERD (gastroesophageal reflux disease), Hypercholesteremia, Hyperinsulinemia, Hyperlipidemia, Migraine, Psychogenic nonepileptic seizure, PTSD (post-traumatic stress disorder), RLS (restless legs syndrome), and Vitamin D deficiency.  Past Surgical History:  has a past surgical history that includes Toe Surgery; pelvic laparoscopy; Tubal ligation; Appendectomy; Hysterectomy; knee surgery (Right); Neck surgery; Carpal tunnel release (Bilateral, 2021); Cholecystectomy; and IR BIOPSY LIVER PERCUTANEOUS (4/3/2023).    Assessment  Body Structures, Functions, Activity Limitations Requiring Skilled Therapeutic Intervention: Decreased functional mobility ;Decreased endurance;Decreased balance;Decreased high-level IADLs;Decreased strength  Therapy Prognosis: Good  Decision Making: Medium Complexity  Clinical Presentation: evolving  Requires PT Follow-Up: Yes  Activity Tolerance  Activity Tolerance: Patient tolerated evaluation without incident    Plan  Physical Therapy Plan  General Plan: 3-5 times per week  Current Treatment Recommendations: Strengthening, ROM, Balance training, Functional mobility training, Transfer training, ADL/Self-care training, IADL training, Endurance training, Gait training, Stair training, Neuromuscular re-education, Patient/Caregiver education & training,

## 2025-05-07 NOTE — DISCHARGE SUMMARY
V2.0  Discharge Summary    Name:  Shayy Newsome /Age/Sex: 1975 (49 y.o. female)   Admit Date: 2025  Discharge Date: 25    MRN & CSN:  [id number] & [id number] Encounter Date and Time 25 9:55 AM EDT    Attending:  Scottie Fernandez MD Discharging Provider: OCTAVIANO Chairez - CNP       Hospital Course:     Brief HPI: Shayy Newsome is a 49 y.o. female with history of anxiety, depression, PTSD, hyperlipidemia, migraine, GERD, GREEN, seizure disorder, restless leg syndrome, DVT who presented with strokelike symptoms including left arm and leg numbness tingling sensation associated with headache.  Patient's CT of head, CTA head and neck no acute process.  NIH score 0.  MRI of brain is negative for acute stroke.  Patient received 1 dose of migraine cocktail including Toradol, Compazine, Benadryl this morning.  Patient reported resolved symptoms.  She tolerated regular diet.  Patient will be discharged home.  Continue home medications.  Follow-up with PCP and neurology outpatient    Brief Problem Based Course:   Complicated migraine  - CT head, CTA head and neck-no acute process  -NIHSS-0  - Continue NIHSS/PT/OT/SLP evaluation  - Continue aspirin, statin  - MRI brain is negative for acute stroke  -Received 1 dose of migraine cocktail  -Reported resolved symptoms     #.  Acute sinusitis  - Complete opacification of the right sphenoid sinus on CT head  - Continue Augmentin in discharge     #.  Anxiety, depression, PTSD, insomnia  - BuSpar, doxepin, hydroxyzine, Caplyta, doxepin     #.  Hyperlipidemia-on Crestor, fenofibrate     #.  Chronic migraine  - Gabapentin, Aimovig     #.  GERD-on Nexium, Protonix     #. GREEN-on rezdiffra     #.  Seizure disorder-on Lamictal     #.  Restless leg syndrome-on pramipexole     #.  History of superficial right parotidectomy-2020     #.  History of left lower extremity DVT-10/2022  - Hypercoagulable workup was normal     #.  Obesity with BMI 38.83      The patient      Hepatic:   Recent Labs     05/06/25  1405   AST 36   ALT 53*   BILITOT 0.2   ALKPHOS 62     Lipids:   Lab Results   Component Value Date/Time    CHOL 153 05/07/2025 05:26 AM    HDL 42 05/07/2025 05:26 AM    TRIG 273 05/07/2025 05:26 AM     Hemoglobin A1C:   Lab Results   Component Value Date/Time    LABA1C 6.2 05/07/2025 05:26 AM     TSH: No results found for: \"TSH\"  Troponin:   Lab Results   Component Value Date/Time    TROPONINT <0.010 08/23/2018 05:15 PM     Lactic Acid: No results for input(s): \"LACTA\" in the last 72 hours.  BNP: No results for input(s): \"PROBNP\" in the last 72 hours.  UA:  Lab Results   Component Value Date/Time    NITRU NEGATIVE 05/18/2024 06:15 PM    COLORU YELLOW 05/18/2024 06:15 PM    PHUR 6.0 05/18/2024 06:15 PM    WBCUA NEGATIVE 04/05/2022 11:39 AM    RBCUA NEGATIVE 04/05/2022 11:39 AM    MUCUS NEGATIVE 08/23/2018 05:15 PM    BACTERIA RARE 04/05/2022 11:39 AM    CLARITYU CLEAR 05/18/2024 06:15 PM    LEUKOCYTESUR NEGATIVE 05/18/2024 06:15 PM    UROBILINOGEN 2.0 05/18/2024 06:15 PM    BILIRUBINUR NEGATIVE 05/18/2024 06:15 PM    BLOODU NEGATIVE 05/18/2024 06:15 PM    GLUCOSEU NEGATIVE 05/18/2024 06:15 PM    KETUA NEGATIVE 05/18/2024 06:15 PM     Urine Cultures: No results found for: \"LABURIN\"  Blood Cultures: No results found for: \"BC\"  No results found for: \"BLOODCULT2\"  Organism: No results found for: \"ORG\"    Time Spent Discharging patient 35 minutes    Electronically signed by OCTAVIANO Chairez CNP on 5/7/2025 at 12:47 PM

## 2025-05-07 NOTE — PROGRESS NOTES
4 Eyes Skin Assessment     NAME:  Shayy Newsome  YOB: 1975  MEDICAL RECORD NUMBER:  [id number]    The patient is being assessed for  Admission    I agree that at least one RN has performed a thorough Head to Toe Skin Assessment on the patient. ALL assessment sites listed below have been assessed.      Areas assessed by both nurses:    Head, Face, Ears, Shoulders, Back, Chest, Arms, Elbows, Hands, Sacrum. Buttock, Coccyx, Ischium, and Legs. Feet and Heels        Does the Patient have a Wound? No noted wound(s)       Shane Prevention initiated by RN: No  Wound Care Orders initiated by RN: No    Pressure Injury (Stage 3,4, Unstageable, DTI, NWPT, and Complex wounds) if present, place Wound referral order by RN under : No    New Ostomies, if present place, Ostomy referral order under : No     Nurse 1 eSignature: Electronically signed by Jennifer Turner RN on 5/6/25 at 10:14 PM EDT    **SHARE this note so that the co-signing nurse can place an eSignature**    Nurse 2 eSignature: {Esignature:[id number]}

## 2025-08-21 ENCOUNTER — TRANSCRIBE ORDERS (OUTPATIENT)
Dept: ADMINISTRATIVE | Age: 50
End: 2025-08-21

## 2025-08-21 DIAGNOSIS — M54.16 LUMBAR RADICULOPATHY: Primary | ICD-10-CM
